# Patient Record
Sex: FEMALE | URBAN - METROPOLITAN AREA
[De-identification: names, ages, dates, MRNs, and addresses within clinical notes are randomized per-mention and may not be internally consistent; named-entity substitution may affect disease eponyms.]

---

## 2019-09-26 ENCOUNTER — OFFICE VISIT (OUTPATIENT)
Dept: URBAN - METROPOLITAN AREA CLINIC 40 | Facility: CLINIC | Age: 59
End: 2019-09-26
Payer: COMMERCIAL

## 2019-09-26 PROCEDURE — 92014 COMPRE OPH EXAM EST PT 1/>: CPT | Performed by: OPTOMETRIST

## 2019-09-26 ASSESSMENT — INTRAOCULAR PRESSURE
OD: 11
OS: 11

## 2019-09-26 ASSESSMENT — KERATOMETRY
OD: 44.88
OS: 44.88

## 2019-09-26 ASSESSMENT — VISUAL ACUITY
OS: 20/20
OD: 20/20

## 2019-09-26 NOTE — IMPRESSION/PLAN
Impression: Presbyopia: H52.4. Plan: Discussed diagnosis in detail with patient. New glasses Rx was given today. Recommend UV protection. Potential for initial adaptation discussed. If not better see MD for cataract evaluation.

## 2019-10-18 ENCOUNTER — TESTING ONLY (OUTPATIENT)
Dept: URBAN - METROPOLITAN AREA CLINIC 40 | Facility: CLINIC | Age: 59
End: 2019-10-18

## 2019-10-18 DIAGNOSIS — H52.4 PRESBYOPIA: Primary | ICD-10-CM

## 2019-10-18 PROCEDURE — V2799 MISC VISION ITEM OR SERVICE: HCPCS | Performed by: OPTOMETRIST

## 2019-10-18 ASSESSMENT — KERATOMETRY
OS: 45.13
OD: 44.63

## 2019-10-18 ASSESSMENT — VISUAL ACUITY
OS: 20/20
OD: 20/20

## 2020-03-05 ENCOUNTER — TESTING ONLY (OUTPATIENT)
Dept: URBAN - METROPOLITAN AREA CLINIC 40 | Facility: CLINIC | Age: 60
End: 2020-03-05

## 2020-03-05 DIAGNOSIS — H25.041 POSTERIOR SUBCAPSULAR POLAR AGE-RELATED CATARACT, RIGHT EYE: Primary | ICD-10-CM

## 2020-03-05 PROCEDURE — V2799 MISC VISION ITEM OR SERVICE: HCPCS | Performed by: OPTOMETRIST

## 2020-03-05 NOTE — IMPRESSION/PLAN
Impression: Posterior subcapsular polar age-related cataract, right eye: H25.041. Plan: Cataracts account for the patient's complaints. Discussed all risks, benefits, procedures and recovery. Patient understands changing glasses will not improve vision. Patient desires to have surgery, recommend phacoemulsification with intraocular lens.